# Patient Record
Sex: FEMALE | Race: NATIVE HAWAIIAN OR OTHER PACIFIC ISLANDER | HISPANIC OR LATINO | Employment: FULL TIME | URBAN - NONMETROPOLITAN AREA
[De-identification: names, ages, dates, MRNs, and addresses within clinical notes are randomized per-mention and may not be internally consistent; named-entity substitution may affect disease eponyms.]

---

## 2024-07-22 LAB — EXTERNAL PLATELET COUNT: 355 K/ΜL

## 2024-07-25 LAB
BACTERIA SPEC AEROBE CULT: NO GROWTH
C TRACH RRNA SPEC DONR QL NAA+PROBE: NEGATIVE
EXTERNAL ABO GROUPING: NORMAL
EXTERNAL HEMATOCRIT: 36 %
EXTERNAL HEMOGLOBIN: 12.7 G/DL
EXTERNAL HEPATITIS B SURFACE ANTIGEN: NEGATIVE
EXTERNAL RH FACTOR: NEGATIVE
HCV AB S/CO SERPL IA: NEGATIVE
HIV 1+2 AB+HIV1 P24 AG SERPL QL IA: NEGATIVE
N GONORRHOEA DNA SPEC QL NAA+PROBE: NEGATIVE
RUBV IGG SERPL IA-ACNC: POSITIVE
TREPONEMA PALLIDUM IGG+IGM AB [PRESENCE] IN SERUM OR PLASMA BY IMMUNOASSAY: NEGATIVE

## 2024-11-07 ENCOUNTER — INITIAL PRENATAL (OUTPATIENT)
Dept: OBSTETRICS AND GYNECOLOGY | Facility: CLINIC | Age: 19
End: 2024-11-07
Payer: COMMERCIAL

## 2024-11-07 VITALS — DIASTOLIC BLOOD PRESSURE: 82 MMHG | WEIGHT: 260.4 LBS | SYSTOLIC BLOOD PRESSURE: 122 MMHG

## 2024-11-07 DIAGNOSIS — Z34.92 SECOND TRIMESTER PREGNANCY: Primary | ICD-10-CM

## 2024-11-07 RX ORDER — FERROUS SULFATE 325(65) MG
325 TABLET ORAL
COMMUNITY
Start: 2024-07-03 | End: 2024-11-07

## 2024-11-07 RX ORDER — VITAMIN A ACETATE, BETA CAROTENE, ASCORBIC ACID, CHOLECALCIFEROL, .ALPHA.-TOCOPHEROL ACETATE, DL-, THIAMINE MONONITRATE, RIBOFLAVIN, NIACINAMIDE, PYRIDOXINE HYDROCHLORIDE, FOLIC ACID, CYANOCOBALAMIN, CALCIUM CARBONATE, FERROUS FUMARATE, ZINC OXIDE, CUPRIC OXIDE 3080; 12; 120; 400; 1; 1.84; 3; 20; 22; 920; 25; 200; 27; 10; 2 [IU]/1; UG/1; MG/1; [IU]/1; MG/1; MG/1; MG/1; MG/1; MG/1; [IU]/1; MG/1; MG/1; MG/1; MG/1; MG/1
1 TABLET, FILM COATED ORAL EVERY 24 HOURS
COMMUNITY
Start: 2024-07-03

## 2024-11-07 RX ORDER — POLYETHYLENE GLYCOL 3350 17 G/17G
17 POWDER, FOR SOLUTION ORAL EVERY 24 HOURS
COMMUNITY
Start: 2024-08-21 | End: 2024-11-07

## 2024-11-07 RX ORDER — ONDANSETRON 4 MG/1
4 TABLET, FILM COATED ORAL EVERY 8 HOURS PRN
COMMUNITY
Start: 2024-07-25 | End: 2024-11-07

## 2024-11-07 RX ORDER — ASPIRIN 81 MG/1
81 TABLET, CHEWABLE ORAL EVERY 24 HOURS
COMMUNITY
Start: 2024-07-03

## 2024-11-07 NOTE — PROGRESS NOTES
Subjective   Chief Complaint   Patient presents with    Initial Prenatal Visit     Transfer of care, Scan done today, Glucose testing is due     Jared Salgado is a 19 y.o. year old .  No LMP recorded. Patient is pregnant.  She presents to be seen because of transfer of care 27 weeks.     OTHER COMPLAINTS:  Good helath, no surigeries    The following portions of the patient's history were reviewed and updated as appropriate:She  has a past medical history of Urinary tract infection.  She does not have a problem list on file.  She  has no past surgical history on file.  Her family history includes Diabetes in her father.  She  reports that she has never smoked. She does not have any smokeless tobacco history on file. Alcohol use questions deferred to the physician. She reports that she does not use drugs.  Current Outpatient Medications   Medication Sig Dispense Refill    prenatal vitamins (PRENATAL 27-) 27-1 MG tablet tablet Take 1 tablet by mouth Daily.      aspirin 81 MG chewable tablet Chew 1 tablet Daily. (Patient not taking: Reported on 2024)       No current facility-administered medications for this visit.     Current Outpatient Medications on File Prior to Visit   Medication Sig    prenatal vitamins (PRENATAL 27-) 27-1 MG tablet tablet Take 1 tablet by mouth Daily.    [DISCONTINUED] ferrous sulfate 325 (65 FE) MG tablet Take 1 tablet by mouth Every Other Day.    [DISCONTINUED] ondansetron (ZOFRAN) 4 MG tablet Take 1 tablet by mouth Every 8 (Eight) Hours As Needed.    [DISCONTINUED] polyethylene glycol (MIRALAX) 17 GM/SCOOP powder Take 17 g by mouth Daily.    aspirin 81 MG chewable tablet Chew 1 tablet Daily. (Patient not taking: Reported on 2024)     No current facility-administered medications on file prior to visit.     She is allergic to cinnamon.    Social History    Tobacco Use      Smoking status: Never      Smokeless tobacco: Not on file    Review of Systems  Consitutional POS:  nothing reported    NEG: anorexia or night sweats   Gastointestinal POS: nothing reported    NEG: bloating, change in bowel habits, melena, or reflux symptoms   Genitourinary POS: nothing reported    NEG: dysuria or hematuria   Integument POS: nothing reported    NEG: moles that are changing in size, shape, color or rashes   Breast POS: nothing reported    NEG: persistent breast lump, skin dimpling, or nipple discharge         Respiratory: negative  Cardiovascular: negative  GYN:  negative          Objective   /82   Wt 118 kg (260 lb 6.4 oz)     General:  well developed; well nourished  no acute distress  mentation appropriate   Skin:  No suspicious lesions seen   Thyroid: normal to inspection and palpation   Lungs:  breathing is unlabored  clear to auscultation bilaterally   Heart:  regular rate and rhythm, S1, S2 normal, no murmur, click, rub or gallop   Breasts:  Examined in supine position  Symmetric without masses or skin dimpling  Nipples normal without inversion, lesions or discharge  There are no palpable axillary nodes   Abdomen: soft, non-tender; no masses  no umbilical or inguinal hernias are present  no hepato-splenomegaly   Pelvis: Clinical staff was present for exam  External genitalia:  normal appearance of the external genitalia including Bartholin's and [place]'s glands.  :  urethral meatus normal;  Vaginal:  normal pink mucosa without prolapse or lesions.  Cervix:  normal appearance.  Uterus:  normal size, shape and consistency.  Adnexa:  normal bimanual exam of the adnexa.     Psychiatric: Alert and oriented ×3, mood and affect appropriate  HEENT: Atraumatic, normocephalic, normal scleral icterus  Extremities: 2+ pulses bilaterally, no edema      Lab Review   8 positive.  Rubella immune.  HIV/RPR/hep C negative.  CBC normal.    Imaging   Ultrasound today shows a viable male infant in vertex position.  Overall growth 34.2 percentile.  Symmetric.  2 pounds 5 ounces       Assessment   Transfer  care at 27 weeks 3 days.  Prior care but [facility].  Records reviewed noted in chart.  Normal anatomy ultrasound at 20 weeks.     Plan   Glucola this week   Diet/exercise    No orders of the defined types were placed in this encounter.         This note was electronically signed.      November 7, 2024

## 2024-11-12 DIAGNOSIS — Z34.92 SECOND TRIMESTER PREGNANCY: Primary | ICD-10-CM

## 2024-11-13 LAB
BASOPHILS # BLD AUTO: 0.04 10*3/MM3 (ref 0–0.2)
BASOPHILS NFR BLD AUTO: 0.3 % (ref 0–1.5)
EOSINOPHIL # BLD AUTO: 0.07 10*3/MM3 (ref 0–0.4)
EOSINOPHIL NFR BLD AUTO: 0.5 % (ref 0.3–6.2)
ERYTHROCYTE [DISTWIDTH] IN BLOOD BY AUTOMATED COUNT: 12.6 % (ref 12.3–15.4)
GLUCOSE 1H P 50 G GLC PO SERPL-MCNC: 149 MG/DL (ref 65–139)
HCT VFR BLD AUTO: 35.5 % (ref 34–46.6)
HGB BLD-MCNC: 12.1 G/DL (ref 12–15.9)
IMM GRANULOCYTES # BLD AUTO: 0.07 10*3/MM3 (ref 0–0.05)
IMM GRANULOCYTES NFR BLD AUTO: 0.5 % (ref 0–0.5)
LYMPHOCYTES # BLD AUTO: 2.31 10*3/MM3 (ref 0.7–3.1)
LYMPHOCYTES NFR BLD AUTO: 17 % (ref 19.6–45.3)
MCH RBC QN AUTO: 34 PG (ref 26.6–33)
MCHC RBC AUTO-ENTMCNC: 34.1 G/DL (ref 31.5–35.7)
MCV RBC AUTO: 99.7 FL (ref 79–97)
MONOCYTES # BLD AUTO: 0.61 10*3/MM3 (ref 0.1–0.9)
MONOCYTES NFR BLD AUTO: 4.5 % (ref 5–12)
NEUTROPHILS # BLD AUTO: 10.46 10*3/MM3 (ref 1.7–7)
NEUTROPHILS NFR BLD AUTO: 77.2 % (ref 42.7–76)
NRBC BLD AUTO-RTO: 0 /100 WBC (ref 0–0.2)
PLATELET # BLD AUTO: 335 10*3/MM3 (ref 140–450)
RBC # BLD AUTO: 3.56 10*6/MM3 (ref 3.77–5.28)
WBC # BLD AUTO: 13.56 10*3/MM3 (ref 3.4–10.8)

## 2024-11-19 ENCOUNTER — TELEPHONE (OUTPATIENT)
Dept: OBSTETRICS AND GYNECOLOGY | Facility: CLINIC | Age: 19
End: 2024-11-19
Payer: COMMERCIAL

## 2024-11-19 NOTE — TELEPHONE ENCOUNTER
Provider: Sixto Kearney MD     Caller: Jared Salgado  Female, 19 y.o., 2005  MRN: [id number]  CSN: [id number]  Phone: [phone]    Relationship to Patient: self          Reason for Call- PT CALLED AND WOULD LIKE TO KNOW IF HER GLUCOSE TEST RESULTS WOULD BE AFFECTED DUE TO HER HAVING A COLD AT THIS TIME, PT WOULD LIKE A CALL BACK TO ADVISE

## 2024-11-20 DIAGNOSIS — R73.09 ABNORMAL GTT (GLUCOSE TOLERANCE TEST): Primary | ICD-10-CM

## 2024-11-21 LAB
GLUCOSE 1H P 100 G GLC PO SERPL-MCNC: 136 MG/DL (ref 65–179)
GLUCOSE 2H P 100 G GLC PO SERPL-MCNC: 124 MG/DL (ref 65–154)
GLUCOSE 3H P 100 G GLC PO SERPL-MCNC: 128 MG/DL (ref 65–139)
GLUCOSE P FAST SERPL-MCNC: 82 MG/DL (ref 65–94)

## 2024-11-22 ENCOUNTER — TELEPHONE (OUTPATIENT)
Dept: OBSTETRICS AND GYNECOLOGY | Facility: CLINIC | Age: 19
End: 2024-11-22
Payer: COMMERCIAL

## 2024-11-22 NOTE — TELEPHONE ENCOUNTER
Caller: Jared Salgado    Relationship: Self    Best call back number: [phone]      Who are you requesting to speak with (clinical staff, DR. JO      What was the call regarding: PATIENT CALLED REGARDING TEST RESULTS, PLEASE ASSIST.

## 2024-11-26 ENCOUNTER — ROUTINE PRENATAL (OUTPATIENT)
Dept: OBSTETRICS AND GYNECOLOGY | Facility: CLINIC | Age: 19
End: 2024-11-26
Payer: COMMERCIAL

## 2024-11-26 VITALS
WEIGHT: 263 LBS | SYSTOLIC BLOOD PRESSURE: 124 MMHG | BODY MASS INDEX: 42.27 KG/M2 | HEIGHT: 66 IN | DIASTOLIC BLOOD PRESSURE: 68 MMHG

## 2024-11-26 DIAGNOSIS — Z34.93 PRENATAL CARE IN THIRD TRIMESTER: Primary | ICD-10-CM

## 2024-11-26 PROCEDURE — 0502F SUBSEQUENT PRENATAL CARE: CPT | Performed by: OBSTETRICS & GYNECOLOGY

## 2024-11-26 NOTE — PROGRESS NOTES
"Prenatal Care Visit    Subjective   Chief Complaint   Patient presents with    Routine Prenatal Visit     No Complaints/concerns        History:   Jared is a  currently at 30w0d who presents for a prenatal care visit today.    No issues.    Social History    Tobacco Use      Smoking status: Never      Smokeless tobacco: Not on file       Objective   /68   Ht 167.6 cm (66\")   Wt 119 kg (263 lb)   BMI 42.45 kg/m²   Physical Exam:  Normal, gestational age-appropriate exam today        Plan   Medical Decision Making:    I have reviewed the prenatal labs and ultrasound(s) today. I have reviewed the most recent prenatal progress note(s).    Diagnosis: Supervision of low risk pregnancy   Tests/Orders/Rx today: No orders of the defined types were placed in this encounter.      Medication Management: none     Topics discussed: Prenatal care milestones  kick counts and fetal movement  PIH precautions   labor signs and symptoms   Tests next visit: U/S for EFW   Next visit: 2 week(s)     Sixto Kearney MD  Obstetrics and Gynecology  [facility]    "

## 2024-12-11 ENCOUNTER — ROUTINE PRENATAL (OUTPATIENT)
Dept: OBSTETRICS AND GYNECOLOGY | Facility: CLINIC | Age: 19
End: 2024-12-11
Payer: COMMERCIAL

## 2024-12-11 VITALS — BODY MASS INDEX: 42.61 KG/M2 | WEIGHT: 264 LBS | DIASTOLIC BLOOD PRESSURE: 64 MMHG | SYSTOLIC BLOOD PRESSURE: 120 MMHG

## 2024-12-11 DIAGNOSIS — Z34.93 THIRD TRIMESTER PREGNANCY: Primary | ICD-10-CM

## 2024-12-11 NOTE — PROGRESS NOTES
Chief Complaint   Patient presents with    Routine Prenatal Visit     Prenatal visit with Growth scan done today.  No problems or concerns        HPI:   , 32w1d gestation reports doing well    ROS:  See Prenatal Episode/Flowsheet  /64   Wt 120 kg (264 lb)   BMI 42.61 kg/m²      EXAM:  EXTREMITIES:  No swelling-See Prenatal Episode/Flowsheet    ABDOMEN:  FHTs/Movement noted-See Prenatal Episode/Flowsheet    URINE GLUCOSE/PROTEIN:  See Prenatal Episode/Flowsheet    PELVIC EXAM:  See Prenatal Episode/Flowsheet  CV:  Lungs:  GYN:    MDM:    Lab Results   Component Value Date    HGB 12.1 2024    ABORH A POS 2024    ABSCRN NEG 2024    CXF4AKNY 136 2024       U/S: Overall growth 49.9 percentile.  MARISA 14.53.  Vertex.  Posterior placenta.  Active fetus    1. IUP 32w1d  2. Routine care   3.  Normal growth  4.  Abnormal 1 hour normal 3-hour

## 2024-12-30 ENCOUNTER — ROUTINE PRENATAL (OUTPATIENT)
Dept: OBSTETRICS AND GYNECOLOGY | Facility: CLINIC | Age: 19
End: 2024-12-30
Payer: COMMERCIAL

## 2024-12-30 VITALS — DIASTOLIC BLOOD PRESSURE: 80 MMHG | SYSTOLIC BLOOD PRESSURE: 122 MMHG | BODY MASS INDEX: 42.61 KG/M2 | WEIGHT: 264 LBS

## 2024-12-30 DIAGNOSIS — Z34.03 ENCOUNTER FOR SUPERVISION OF NORMAL FIRST PREGNANCY IN THIRD TRIMESTER: Primary | ICD-10-CM

## 2024-12-30 PROCEDURE — 0502F SUBSEQUENT PRENATAL CARE: CPT | Performed by: MIDWIFE

## 2024-12-30 RX ORDER — ONDANSETRON 4 MG/1
4 TABLET, FILM COATED ORAL DAILY PRN
Qty: 30 TABLET | Refills: 0 | Status: SHIPPED | OUTPATIENT
Start: 2024-12-30 | End: 2025-12-30

## 2024-12-30 NOTE — PROGRESS NOTES
Chief Complaint   Patient presents with    Routine Prenatal Visit     Jt hick contractions        HPI: Jared is a  currently at 34w6d here for prenatal visit who reports the following:  Baby is active. She has been having [place] Rhodes ctxs, relieved by rest. She has had an increase in N/V and requests Zofran.                EXAM:     Vitals:    24 0931   BP: 122/80      Abdomen:   See prenatal flowsheet as noted and reviewed, soft, nontender   Pelvic:  See prenatal flowsheet as noted and reviewed   Urine:  See prenatal flowsheet as noted and reviewed    Lab Results   Component Value Date    ABORH A POS 2024    ABSCRN NEG 2024       MDM:  Impression: Supervision of low risk pregnancy  Failed 1 hr glucola, passed 3 hr GTT.   Tests done today: none   Topics discussed: kick counts and fetal movement   labor signs and symptoms  Reviewed OB labs   Tests next visit: GBS testing                RTO:                        2 weeks    This note was electronically signed.  Regine Garduno, APRN  2024

## 2025-01-13 ENCOUNTER — ROUTINE PRENATAL (OUTPATIENT)
Dept: OBSTETRICS AND GYNECOLOGY | Facility: CLINIC | Age: 20
End: 2025-01-13
Payer: COMMERCIAL

## 2025-01-13 VITALS — BODY MASS INDEX: 42.61 KG/M2 | WEIGHT: 264 LBS | DIASTOLIC BLOOD PRESSURE: 78 MMHG | SYSTOLIC BLOOD PRESSURE: 128 MMHG

## 2025-01-13 DIAGNOSIS — Z36.85 ANTENATAL SCREENING FOR STREPTOCOCCUS B: ICD-10-CM

## 2025-01-13 DIAGNOSIS — Z34.93 PRENATAL CARE IN THIRD TRIMESTER: Primary | ICD-10-CM

## 2025-01-13 DIAGNOSIS — Z34.03 ENCOUNTER FOR SUPERVISION OF NORMAL FIRST PREGNANCY IN THIRD TRIMESTER: ICD-10-CM

## 2025-01-13 NOTE — PROGRESS NOTES
Prenatal Care Visit    Subjective   Chief Complaint   Patient presents with    Routine Prenatal Visit     GBS done today, patient complains of nausea.        History:   Jared is a  currently at 36w6d who presents for a prenatal care visit today.    No issues.    Social History    Tobacco Use      Smoking status: Never      Smokeless tobacco: Not on file       Objective   /78   Wt 120 kg (264 lb)   BMI 42.61 kg/m²   Physical Exam:  Normal, gestational age-appropriate exam today        Plan   Medical Decision Making:    I have reviewed the prenatal labs and ultrasound(s) today. I have reviewed the most recent prenatal progress note(s).    Diagnosis: Supervision of low risk pregnancy  Rh NEG   Tests/Orders/Rx today: Orders Placed This Encounter   Procedures    Strep Grp B RISHI + Reflex - Swab, Vaginal/Rectum     Order Specific Question:   Release to patient     Answer:   Routine Release [[id number]]       Medication Management: none     Topics discussed: Prenatal care milestones  kick counts and fetal movement  labor signs and symptoms  PIH precautions  Unable to provide Rhogam because office has none, hopefully will get next visit   Tests next visit: none   Next visit: 1 week(s)     Sixto Kearney MD  Obstetrics and Gynecology  [facility]

## 2025-01-16 ENCOUNTER — TELEPHONE (OUTPATIENT)
Dept: OBSTETRICS AND GYNECOLOGY | Facility: CLINIC | Age: 20
End: 2025-01-16

## 2025-01-16 NOTE — TELEPHONE ENCOUNTER
Caller: Jared Salgado    Relationship: Self    Best call back number: [phone]    What is the best time to reach you: ANY    Who are you requesting to speak with (clinical staff, provider,  specific staff member): CLINICAL       What was the call regarding: PT IS REQUESTING  A CALL BACK TO DISCUSS WHEN HE ANNE MEDICAL RELEASE WILL BE FAXED TO [facility]. FILLED PAPERWORK OUT IN OFFICE 1/13/25

## 2025-01-18 LAB
CLINDAMYCIN ISLT KB: ABNORMAL
GP B STREP DNA SPEC QL NAA+PROBE: POSITIVE
ORGANISM ID: ABNORMAL